# Patient Record
Sex: MALE | Race: WHITE | NOT HISPANIC OR LATINO | Employment: OTHER | URBAN - METROPOLITAN AREA
[De-identification: names, ages, dates, MRNs, and addresses within clinical notes are randomized per-mention and may not be internally consistent; named-entity substitution may affect disease eponyms.]

---

## 2024-08-07 ENCOUNTER — HOSPITAL ENCOUNTER (EMERGENCY)
Facility: HOSPITAL | Age: 44
Discharge: HOME | End: 2024-08-08
Payer: MEDICARE

## 2024-08-07 VITALS
OXYGEN SATURATION: 95 % | SYSTOLIC BLOOD PRESSURE: 132 MMHG | TEMPERATURE: 98.2 F | RESPIRATION RATE: 18 BRPM | DIASTOLIC BLOOD PRESSURE: 86 MMHG | HEART RATE: 87 BPM

## 2024-08-07 DIAGNOSIS — Z99.81 OXYGEN DEPENDENT: Primary | ICD-10-CM

## 2024-08-07 PROCEDURE — 99281 EMR DPT VST MAYX REQ PHY/QHP: CPT

## 2024-08-07 PROCEDURE — 99283 EMERGENCY DEPT VISIT LOW MDM: CPT

## 2024-08-08 ENCOUNTER — HOSPITAL ENCOUNTER (EMERGENCY)
Facility: HOSPITAL | Age: 44
Discharge: HOME | End: 2024-08-09
Payer: MEDICARE

## 2024-08-08 DIAGNOSIS — Z13.9 ENCOUNTER FOR MEDICAL SCREENING EXAMINATION: Primary | ICD-10-CM

## 2024-08-08 PROCEDURE — 99283 EMERGENCY DEPT VISIT LOW MDM: CPT

## 2024-08-08 ASSESSMENT — PAIN - FUNCTIONAL ASSESSMENT: PAIN_FUNCTIONAL_ASSESSMENT: UNABLE TO SELF-REPORT

## 2024-08-08 ASSESSMENT — COLUMBIA-SUICIDE SEVERITY RATING SCALE - C-SSRS
6. HAVE YOU EVER DONE ANYTHING, STARTED TO DO ANYTHING, OR PREPARED TO DO ANYTHING TO END YOUR LIFE?: NO
2. HAVE YOU ACTUALLY HAD ANY THOUGHTS OF KILLING YOURSELF?: NO
1. IN THE PAST MONTH, HAVE YOU WISHED YOU WERE DEAD OR WISHED YOU COULD GO TO SLEEP AND NOT WAKE UP?: NO

## 2024-08-08 NOTE — PROGRESS NOTES
Emergency Medicine Transition of Care Note.    I received Cristobal Whitaker in signout from [employer] [facility].  Please see the previous ED provider note for all HPI, PE and MDM up to the time of signout at 2200. This is in addition to the primary record.    In brief Cristobal Whitaker is an 43 y.o. male presenting for   Chief Complaint   Patient presents with    Power out needs o2     At the time of signout we were awaiting: Power to be restored at residence    ED Course as of 08/08/24 0227   Wed Aug 07, 2024   2156 Patient care handed off to See Cohen NP, he will follow-up and disposition of the patient. [SB]      ED Course User Index  [SB] Camilla Quezada, JOEL-CNP         Diagnoses as of 08/08/24 0227   Oxygen dependent       Medical Decision Making  Patient care was signed out to me at this time.  Please refer to initial providers note for initial plan of care of this patient.  Patient care was signed out pending further observation of patient until power can be returned at residence.  Patient is resting comfortably in emergency room with family in no distress.  Plan will be for patient to be observed in the emergency room until power can be returned to residence.    Final diagnoses:   [Z99.81] Oxygen dependent           Procedure  Procedures    See Cohen, JOEL-CNP

## 2024-08-08 NOTE — ED PROVIDER NOTES
Limitations to History: None     HPI:      Cristobal Whitaker is a 43 y.o. with significant past medical history for Duchenne muscular dystrophy, acute respiratory failure with hypercapnia on ventilator, anxiety, cardiomyopathy and dysphagia s/p PEG tube presenting to ED today from home with family for oxygen.  Due to power outage, patient does not have oxygen at home so they were directed to the ER.  Mom states the patient is at baseline.  Denies fever/chills, cough/cold symptoms, chest pain, shortness of breath, nausea/vomiting, abdominal pain, urinary symptoms, change in bowel habits or any other complaints.    Additional History Obtained from: Prior notes    ------------------------------------------------------------------------------------------------------------------------------------------    VS: As documented in the triage note and EMR flowsheet from this visit were reviewed.    Physical Exam:  Gen: 43-year-old  male in power wheelchair with microflora, vent dependent.  Awake and alert, responding to mom.  Head/Neck: NCAT, neck w/ FROM  Eyes: EOMI, PERRL, anicteric sclerae, noninjected conjunctivae  Ears: TMs clear b/l without sign of infection  Nose: Nares patent w/o rhinorrhea  Mouth:  MMM, no OP lesions noted  Heart: RRR no MRG  Lungs: CTA b/l no RRW, no increased work of breathing.  No stridor.  Abdomen: soft, NT, ND, no HSM, no palpable masses  Musculoskeletal: MSPs intact  Neurologic: Alert.  Weakness in all extremities with contractures.  Skin: Pink warm dry, no rash on exposed skin.  Psychological: calm, no SI/HI      ------------------------------------------------------------------------------------------------------------------------------------------    Medical Decision Makin y.o. with significant past medical history for Duchenne muscular dystrophy, acute respiratory failure with hypercapnia on ventilator, anxiety, cardiomyopathy and dysphagia s/p PEG tube is evaluated at the bedside  due to vent dependency and need of oxygen secondary to power outage in the [place] area.  Vital signs within normal limits.  Afebrile.  Acting at baseline per mom.  On oxygen per ventilator.  Mom has the patient's medications that he normally takes.    ED Course as of 08/07/24 2157   Wed Aug 07, 2024   2156 Patient care handed off to See Cohen NP, he will follow-up and disposition of the patient. [SB]      ED Course User Index  [SB] ASAEL Nowak         Diagnoses as of 08/07/24 2157   Oxygen dependent       EKG interpreted by myself (ED attending physician): Not ordered    Chronic Medical Conditions Significantly Affecting Care: None    External Records Reviewed: I reviewed recent and relevant outside records including: None    Discussion of Management with Other Providers: None    I discussed the patient/results with: None       ASAEL Nowak  08/07/24 2157

## 2024-08-09 VITALS
RESPIRATION RATE: 20 BRPM | HEART RATE: 97 BPM | SYSTOLIC BLOOD PRESSURE: 130 MMHG | TEMPERATURE: 97.3 F | DIASTOLIC BLOOD PRESSURE: 79 MMHG | OXYGEN SATURATION: 97 %

## 2024-08-09 NOTE — ED NOTES
Pt presents to ED with c/o needing oxygen due to power outage at home. Pt was seen here the past two nights for same complaint. Pts parents also oxygen dependent and are patients here as well.     Daisy Lucio RN  08/08/24 [id number]

## 2024-08-09 NOTE — ED PROVIDER NOTES
HPI   Chief Complaint   Patient presents with    Needs Respiratory Support/Electricity       43 y.o. male with a significant past medical history for Duchenne muscular dystrophy, acute respiratory failure with hypercapnia on ventilator, anxiety, cardiomyopathy and dysphagia s/p PEG tube presenting to ED today from home with family for oxygen.  Due to power outage, patient does not have oxygen at home so they were directed to the ER.  Patient's mother reports no medical complaints and states that he has at his baseline.  Patient was seen and evaluated yesterday for a similar reason              Patient History   No past medical history on file.  No past surgical history on file.  No family history on file.  Social History     Tobacco Use    Smoking status: Not on file    Smokeless tobacco: Not on file   Substance Use Topics    Alcohol use: Not on file    Drug use: Not on file       Physical Exam   ED Triage Vitals [08/08/24 2134]   Temperature Heart Rate Respirations BP   36.3 °C (97.3 °F) (!) 102 18 136/75      Pulse Ox Temp Source Heart Rate Source Patient Position   96 % Temporal Monitor Sitting      BP Location FiO2 (%)     Left arm --       Physical Exam  Vitals and nursing note reviewed.   Constitutional:       Appearance: Normal appearance.   Cardiovascular:      Rate and Rhythm: Normal rate and regular rhythm.      Pulses: Normal pulses.   Pulmonary:      Effort: Pulmonary effort is normal.   Abdominal:      Palpations: Abdomen is soft.   Skin:     General: Skin is warm and dry.      Capillary Refill: Capillary refill takes less than 2 seconds.   Neurological:      Mental Status: He is alert. Mental status is at baseline.           ED Course & MDM   Diagnoses as of 08/08/24 2204   Encounter for medical screening examination                 No data recorded     [place] Coma Scale Score: 10 (08/08/24 2135 : Noman Terrazas RN)                           Medical Decision Making  Medical screening examination was  performed.  Patient has no medical complaints and is at his reported baseline.  Patient will be discharged however he will stay in the hospital until the morning when social work can evaluate.        Procedure  Procedures     Sami Corrales PA-C  08/08/24 2202       Sami Corrales PA-C  08/08/24 2204